# Patient Record
Sex: MALE | Race: WHITE
[De-identification: names, ages, dates, MRNs, and addresses within clinical notes are randomized per-mention and may not be internally consistent; named-entity substitution may affect disease eponyms.]

---

## 2020-11-02 ENCOUNTER — HOSPITAL ENCOUNTER (OUTPATIENT)
Dept: HOSPITAL 92 - SCSMRI | Age: 50
Discharge: HOME | End: 2020-11-02
Attending: ORTHOPAEDIC SURGERY
Payer: COMMERCIAL

## 2020-11-02 DIAGNOSIS — S63.213A: ICD-10-CM

## 2020-11-02 DIAGNOSIS — S63.653A: Primary | ICD-10-CM

## 2020-11-02 NOTE — MRI
MRI OF THE LEFT HAND PERFORMED WITHOUT CONTRAST ENHANCEMENT:

 

History: Concern for sagittal band injury of middle finger. 

 

FINDINGS: 

There is mild flexion deformity of the DIP joint of the little finger consistent with what is probabl
y an old mallot type finger. The extensor tendon appears somewhat redundant and maybe partially retra
cted. It appears somewhat thickened at the level of the mid shaft of the proximal phalanx but this is
 probably an older injury. There is cystic change of the fifth metacarpal head also suggesting a poss
ible older injury. 

 

The middle finger does show evidence of a radial sided sagittal band injury at the metacarpal phalang
eal joint. The tendon is slightly ulnarly subluxed. There is also minimal volar displacement of the p
roximal phalanx in relation to the third metacarpal. 

 

IMPRESSION: 

1. Acute appearing sagittal band injury involving the radial side of the sagittal band at the metacar
pal phalangeal joint of the middle finger with some ulnar subluxation of the extensor tendon. 

2. 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 

 

POS: [facility]

## 2020-11-12 ENCOUNTER — HOSPITAL ENCOUNTER (OUTPATIENT)
Dept: HOSPITAL 92 - LABBT | Age: 50
Discharge: HOME | End: 2020-11-12
Attending: ORTHOPAEDIC SURGERY
Payer: COMMERCIAL

## 2020-11-12 DIAGNOSIS — S56.414A: ICD-10-CM

## 2020-11-12 DIAGNOSIS — Z20.828: ICD-10-CM

## 2020-11-12 DIAGNOSIS — Z01.812: Primary | ICD-10-CM

## 2020-11-12 PROCEDURE — U0003 INFECTIOUS AGENT DETECTION BY NUCLEIC ACID (DNA OR RNA); SEVERE ACUTE RESPIRATORY SYNDROME CORONAVIRUS 2 (SARS-COV-2) (CORONAVIRUS DISEASE [COVID-19]), AMPLIFIED PROBE TECHNIQUE, MAKING USE OF HIGH THROUGHPUT TECHNOLOGIES AS DESCRIBED BY CMS-2020-01-R: HCPCS

## 2020-11-12 PROCEDURE — 87635 SARS-COV-2 COVID-19 AMP PRB: CPT

## 2020-11-17 ENCOUNTER — HOSPITAL ENCOUNTER (OUTPATIENT)
Dept: HOSPITAL 92 - SDC | Age: 50
Discharge: HOME | End: 2020-11-17
Attending: ORTHOPAEDIC SURGERY
Payer: COMMERCIAL

## 2020-11-17 VITALS — BODY MASS INDEX: 26.6 KG/M2

## 2020-11-17 DIAGNOSIS — S63.653A: Primary | ICD-10-CM

## 2020-11-17 DIAGNOSIS — Z79.899: ICD-10-CM

## 2020-11-17 PROCEDURE — S0020 INJECTION, BUPIVICAINE HYDRO: HCPCS

## 2020-11-17 PROCEDURE — 0KND0ZZ RELEASE LEFT HAND MUSCLE, OPEN APPROACH: ICD-10-PCS | Performed by: ORTHOPAEDIC SURGERY

## 2020-11-18 NOTE — OP
DATE OF PROCEDURE:  11/17/2020



PREOPERATIVE DIAGNOSES:  Left middle finger subluxation of metacarpophalangeal joint

with laceration  __________with tear radial sagittal band. 



FINDINGS:  Radial sagittal band very attenuated over an 8 mm area beginning at the

proximal edge toward the center at the metacarpal head.  There was almost complete

ulnar subluxation. 



TOURNIQUET TIME:  33 minutes.



ESTIMATED BLOOD LOSS:  10 mL. 



Tight ulnar retinaculum seen, sagittal band level and intrinsics.  Gross subluxation

to almost 100% could be seen with flexion beyond 70 to 90 degrees. 



DESCRIPTION OF PROCEDURE:  After successful general endotracheal anesthesia, the

patient had the limb prepped and draped.  We augmented it with 20 mL of 0.5%

Marcaine metacarpophalangeal block, 10 at the beginning of the procedure and 10 at

the end.  At the end of the procedure, had excellent circulation.  We then

identified the gross subluxation, almost dislocation like seen in the clinic and

outlined an incision, which would allow him to  __________.  Once we did this, we

prepared for the procedure, which would be the following. 

1. Released intrinsics, ulnar aspect, left middle finger.

2. Left middle finger centralization of extensor tendon with sagittal band

andretinaculum repair. 

3. Application of short-arm splint. 



Then, we finished the block, and finished prepping and draping, outline zigzag

incision centered on the proximal base of the proximal phalanx through the

metacarpal head and 5 mm past that.  We then carried this through skin and

subcutaneous tissue to identify the extensor mechanism.  We visualized it with the

dermis-epidermis open, just as we did with the dermis-epidermis intact.  Near

complete subluxation, almost dislocation of extensor mechanism at the MP joint.

Thus, needed to be centralized.  We finished the incision and now identified the

layers.  We identified the retinaculum on the  __________radial side and made a

slight ____________.   The patient also had a very thickened retinaculum on the

ulnar side.  We thus released the ulnar retinaculum, found the redundant tissue in

the radial side of retinaculum and we reduced, outlined a 3 cm retinaculum that was

redundant on the radial side, we debrided this, and then we released the retinaculum

on the   __________ .  At this point, we knew that we had to do at least a

centralization, so we prepared for this. 



The retinaculum now cut, we could now pull the tendon back on top of the

metacarpophalangeal joint, dorsal block centrally.  Once this was done, we were able

to see where to make the incision on the radial side of the retinaculum, we made it.

 We then over sewed pants-over-vest the radial side retinaculum by approximately 3.5

mm to ensure that it would be impossible to loosen.  Once this was done, with 4-0

Prolene interrupted horizontal mattress pants-over-vest technique, the tendon

remained  __________.  The patient now had the repair and we could test it and it

was stable.  We released the tourniquet.  We obtained hemostasis.  Tourniquet time

would be 33 minutes with estimated blood loss 10 mL.  We had one bleeder from venous

structure distally and this was   __________ electrocautery.  The patient had the

dressing applied with a splint.  Left the operating room without evidence of

anesthetic or operative complication. 







Job ID:  [id number]

## 2021-05-18 ENCOUNTER — HOSPITAL ENCOUNTER (OUTPATIENT)
Dept: HOSPITAL 92 - LABBT | Age: 51
Discharge: HOME | End: 2021-05-18
Attending: ORTHOPAEDIC SURGERY
Payer: COMMERCIAL

## 2021-05-18 DIAGNOSIS — Z01.812: Primary | ICD-10-CM

## 2021-05-18 DIAGNOSIS — G56.02: ICD-10-CM

## 2021-05-18 DIAGNOSIS — Z20.822: ICD-10-CM

## 2021-05-18 LAB
BASOPHILS # BLD AUTO: 0 10X3/UL (ref 0–0.2)
BASOPHILS NFR BLD AUTO: 0.5 % (ref 0–2)
EOSINOPHIL # BLD AUTO: 0.1 10X3/UL (ref 0–0.5)
EOSINOPHIL NFR BLD AUTO: 1.8 % (ref 0–6)
HGB BLD-MCNC: 15.3 G/DL (ref 13.5–17.5)
LYMPHOCYTES NFR BLD AUTO: 19.9 % (ref 18–47)
MCH RBC QN AUTO: 28.1 PG (ref 27–33)
MCV RBC AUTO: 82.4 FL (ref 81.2–95.1)
MONOCYTES # BLD AUTO: 0.4 10X3/UL (ref 0–1.1)
MONOCYTES NFR BLD AUTO: 6.1 % (ref 0–10)
NEUTROPHILS # BLD AUTO: 4.7 10X3/UL (ref 1.5–8.4)
NEUTROPHILS NFR BLD AUTO: 71.2 % (ref 40–75)
PLATELET # BLD AUTO: 213 10X3/UL (ref 150–450)
RBC # BLD AUTO: 5.45 10X6/UL (ref 4.32–5.72)
WBC # BLD AUTO: 6.6 10X3/UL (ref 3.5–10.5)

## 2021-05-18 PROCEDURE — U0003 INFECTIOUS AGENT DETECTION BY NUCLEIC ACID (DNA OR RNA); SEVERE ACUTE RESPIRATORY SYNDROME CORONAVIRUS 2 (SARS-COV-2) (CORONAVIRUS DISEASE [COVID-19]), AMPLIFIED PROBE TECHNIQUE, MAKING USE OF HIGH THROUGHPUT TECHNOLOGIES AS DESCRIBED BY CMS-2020-01-R: HCPCS

## 2021-05-18 PROCEDURE — U0005 INFEC AGEN DETEC AMPLI PROBE: HCPCS

## 2021-05-18 PROCEDURE — 85025 COMPLETE CBC W/AUTO DIFF WBC: CPT

## 2021-05-18 PROCEDURE — 87635 SARS-COV-2 COVID-19 AMP PRB: CPT

## 2021-05-21 ENCOUNTER — HOSPITAL ENCOUNTER (OUTPATIENT)
Dept: HOSPITAL 92 - SDC | Age: 51
Discharge: HOME | End: 2021-05-21
Attending: ORTHOPAEDIC SURGERY
Payer: COMMERCIAL

## 2021-05-21 VITALS — BODY MASS INDEX: 25.8 KG/M2

## 2021-05-21 DIAGNOSIS — G56.02: Primary | ICD-10-CM

## 2021-05-21 PROCEDURE — 01N50ZZ RELEASE MEDIAN NERVE, OPEN APPROACH: ICD-10-PCS | Performed by: ORTHOPAEDIC SURGERY

## 2021-05-21 PROCEDURE — S0020 INJECTION, BUPIVICAINE HYDRO: HCPCS
